# Patient Record
Sex: MALE | Race: WHITE
[De-identification: names, ages, dates, MRNs, and addresses within clinical notes are randomized per-mention and may not be internally consistent; named-entity substitution may affect disease eponyms.]

---

## 2020-07-29 ENCOUNTER — HOSPITAL ENCOUNTER (OUTPATIENT)
Dept: HOSPITAL 19 - COL.CAR | Age: 65
Discharge: HOME | End: 2020-07-29
Attending: INTERNAL MEDICINE
Payer: COMMERCIAL

## 2020-07-29 VITALS — HEART RATE: 62 BPM | SYSTOLIC BLOOD PRESSURE: 110 MMHG | DIASTOLIC BLOOD PRESSURE: 64 MMHG | TEMPERATURE: 98 F

## 2020-07-29 VITALS — SYSTOLIC BLOOD PRESSURE: 113 MMHG | DIASTOLIC BLOOD PRESSURE: 69 MMHG | HEART RATE: 75 BPM

## 2020-07-29 VITALS — SYSTOLIC BLOOD PRESSURE: 117 MMHG | HEART RATE: 70 BPM | DIASTOLIC BLOOD PRESSURE: 73 MMHG

## 2020-07-29 VITALS — HEART RATE: 70 BPM | DIASTOLIC BLOOD PRESSURE: 70 MMHG | SYSTOLIC BLOOD PRESSURE: 115 MMHG

## 2020-07-29 VITALS — HEART RATE: 70 BPM | DIASTOLIC BLOOD PRESSURE: 73 MMHG | SYSTOLIC BLOOD PRESSURE: 115 MMHG

## 2020-07-29 VITALS — SYSTOLIC BLOOD PRESSURE: 115 MMHG | HEART RATE: 71 BPM | DIASTOLIC BLOOD PRESSURE: 68 MMHG

## 2020-07-29 VITALS — HEART RATE: 70 BPM | DIASTOLIC BLOOD PRESSURE: 75 MMHG | SYSTOLIC BLOOD PRESSURE: 119 MMHG

## 2020-07-29 VITALS — WEIGHT: 216.27 LBS | HEIGHT: 70 IN | BODY MASS INDEX: 30.96 KG/M2

## 2020-07-29 DIAGNOSIS — Z79.01: ICD-10-CM

## 2020-07-29 DIAGNOSIS — M10.9: ICD-10-CM

## 2020-07-29 DIAGNOSIS — F41.9: ICD-10-CM

## 2020-07-29 DIAGNOSIS — I25.5: ICD-10-CM

## 2020-07-29 DIAGNOSIS — I48.91: Primary | ICD-10-CM

## 2020-07-29 DIAGNOSIS — Z95.0: ICD-10-CM

## 2020-07-29 DIAGNOSIS — I50.9: ICD-10-CM

## 2020-07-29 DIAGNOSIS — I25.10: ICD-10-CM

## 2020-07-29 DIAGNOSIS — I42.0: ICD-10-CM

## 2020-07-29 DIAGNOSIS — E78.2: ICD-10-CM

## 2020-07-29 DIAGNOSIS — M19.90: ICD-10-CM

## 2020-07-29 DIAGNOSIS — Z85.828: ICD-10-CM

## 2020-07-29 DIAGNOSIS — G47.33: ICD-10-CM

## 2020-07-29 DIAGNOSIS — Z87.891: ICD-10-CM

## 2020-07-29 DIAGNOSIS — I11.0: ICD-10-CM

## 2020-07-29 LAB
ANION GAP SERPL CALC-SCNC: 7 MMOL/L (ref 7–16)
APTT PPP: 38 SECONDS (ref 26–37)
BUN SERPL-MCNC: 18 MG/DL (ref 9–20)
CALCIUM SERPL-MCNC: 9.3 MG/DL (ref 8.4–10.2)
CHLORIDE SERPL-SCNC: 103 MMOL/L (ref 98–107)
CO2 SERPL-SCNC: 27 MMOL/L (ref 22–30)
CREAT SERPL-SCNC: 1.12 UMOL/L (ref 0.66–1.25)
ERYTHROCYTE [DISTWIDTH] IN BLOOD BY AUTOMATED COUNT: 12 % (ref 11.5–14.5)
GLUCOSE SERPL-MCNC: 113 MG/DL (ref 74–106)
HCT VFR BLD AUTO: 43.7 % (ref 42–52)
HGB BLD-MCNC: 14.5 G/DL (ref 13.5–18)
INR BLD: 1.3 (ref 0.8–3)
MAGNESIUM SERPL-MCNC: 2.6 MG/DL (ref 1.6–2.3)
MCH RBC QN AUTO: 30 PG (ref 27–31)
MCHC RBC AUTO-ENTMCNC: 33 G/DL (ref 33–37)
MCV RBC AUTO: 91 FL (ref 80–100)
PLATELET # BLD AUTO: 372 K/MM3 (ref 130–400)
PMV BLD AUTO: 9.9 FL (ref 7.4–10.4)
POTASSIUM SERPL-SCNC: 5 MMOL/L (ref 3.4–5)
PROTHROMBIN TIME: 14.8 SECONDS (ref 9.7–12.8)
RBC # BLD AUTO: 4.78 M/MM3 (ref 4.2–5.6)
SODIUM SERPL-SCNC: 137 MMOL/L (ref 137–145)

## 2020-07-29 NOTE — NUR
Pt ready for departure, to exit via wc at this time.  Pt has done well during
recovery, he has remains gcs 15, pwd with reg and unlabored resps.  AV paced
on monitor.  pt is ambulatory with steady gait at time of departure. He
verbalizes understanding of dc/fu and medication instructions. I called [facility] to confirm our understanding of rx instructions. pt denied any further
questions.   IV was dc'd with cath intact, dressing was applied.

## 2020-12-22 ENCOUNTER — HOSPITAL ENCOUNTER (OUTPATIENT)
Dept: HOSPITAL 19 - COL.CAR | Age: 65
Discharge: HOME | End: 2020-12-22
Attending: INTERNAL MEDICINE
Payer: MEDICARE

## 2020-12-22 VITALS — HEART RATE: 69 BPM | DIASTOLIC BLOOD PRESSURE: 66 MMHG | SYSTOLIC BLOOD PRESSURE: 104 MMHG

## 2020-12-22 VITALS — DIASTOLIC BLOOD PRESSURE: 72 MMHG | HEART RATE: 69 BPM | SYSTOLIC BLOOD PRESSURE: 114 MMHG | TEMPERATURE: 98.9 F

## 2020-12-22 VITALS — DIASTOLIC BLOOD PRESSURE: 88 MMHG | SYSTOLIC BLOOD PRESSURE: 120 MMHG | HEART RATE: 72 BPM

## 2020-12-22 VITALS — DIASTOLIC BLOOD PRESSURE: 70 MMHG | HEART RATE: 69 BPM | SYSTOLIC BLOOD PRESSURE: 109 MMHG

## 2020-12-22 VITALS — HEART RATE: 79 BPM | SYSTOLIC BLOOD PRESSURE: 113 MMHG | DIASTOLIC BLOOD PRESSURE: 69 MMHG

## 2020-12-22 VITALS — DIASTOLIC BLOOD PRESSURE: 64 MMHG | HEART RATE: 69 BPM | SYSTOLIC BLOOD PRESSURE: 97 MMHG

## 2020-12-22 VITALS — DIASTOLIC BLOOD PRESSURE: 69 MMHG | HEART RATE: 69 BPM | SYSTOLIC BLOOD PRESSURE: 97 MMHG

## 2020-12-22 VITALS — HEART RATE: 69 BPM | DIASTOLIC BLOOD PRESSURE: 76 MMHG | SYSTOLIC BLOOD PRESSURE: 118 MMHG

## 2020-12-22 VITALS — HEART RATE: 69 BPM | SYSTOLIC BLOOD PRESSURE: 100 MMHG | DIASTOLIC BLOOD PRESSURE: 66 MMHG

## 2020-12-22 VITALS — WEIGHT: 216.49 LBS | BODY MASS INDEX: 30.99 KG/M2 | HEIGHT: 70 IN

## 2020-12-22 VITALS — DIASTOLIC BLOOD PRESSURE: 64 MMHG | SYSTOLIC BLOOD PRESSURE: 96 MMHG | HEART RATE: 69 BPM

## 2020-12-22 VITALS — HEART RATE: 71 BPM | SYSTOLIC BLOOD PRESSURE: 111 MMHG | DIASTOLIC BLOOD PRESSURE: 66 MMHG

## 2020-12-22 DIAGNOSIS — I50.9: ICD-10-CM

## 2020-12-22 DIAGNOSIS — E78.5: ICD-10-CM

## 2020-12-22 DIAGNOSIS — Z45.02: Primary | ICD-10-CM

## 2020-12-22 DIAGNOSIS — I48.91: ICD-10-CM

## 2020-12-22 DIAGNOSIS — I42.9: ICD-10-CM

## 2020-12-22 DIAGNOSIS — I25.10: ICD-10-CM

## 2020-12-22 DIAGNOSIS — I11.0: ICD-10-CM

## 2020-12-22 LAB
ANION GAP SERPL CALC-SCNC: 9 MMOL/L (ref 7–16)
BUN SERPL-MCNC: 28 MG/DL (ref 9–20)
CALCIUM SERPL-MCNC: 9.1 MG/DL (ref 8.4–10.2)
CHLORIDE SERPL-SCNC: 107 MMOL/L (ref 98–107)
CO2 SERPL-SCNC: 23 MMOL/L (ref 22–30)
CREAT SERPL-SCNC: 1.25 UMOL/L (ref 0.66–1.25)
ERYTHROCYTE [DISTWIDTH] IN BLOOD BY AUTOMATED COUNT: 13.6 % (ref 11.5–14.5)
GLUCOSE SERPL-MCNC: 122 MG/DL (ref 74–106)
HCT VFR BLD AUTO: 42.4 % (ref 42–52)
HGB BLD-MCNC: 14.4 G/DL (ref 13.5–18)
INR BLD: 1 (ref 0.8–3)
MCH RBC QN AUTO: 31 PG (ref 27–31)
MCHC RBC AUTO-ENTMCNC: 34 G/DL (ref 33–37)
MCV RBC AUTO: 92 FL (ref 80–100)
PLATELET # BLD AUTO: 323 K/MM3 (ref 130–400)
PMV BLD AUTO: 9.7 FL (ref 7.4–10.4)
POTASSIUM SERPL-SCNC: 4.7 MMOL/L (ref 3.4–5)
PROTHROMBIN TIME: 10.9 SECONDS (ref 9.7–12.8)
RBC # BLD AUTO: 4.61 M/MM3 (ref 4.2–5.6)
SODIUM SERPL-SCNC: 139 MMOL/L (ref 137–145)

## 2020-12-22 PROCEDURE — C1882 AICD, OTHER THAN SING/DUAL: HCPCS

## 2020-12-22 NOTE — NUR
Patient education provided and discussed.  Patient with no questions or
concerns.  Patient verbally aknowledged education.  Patient informed of
need to schedule for 1 week appt with PCP for site check and follow up
with Dr. Galarza in 3 months. Alert and oriented X 4.  Patient signed
aknowlegment of receipt of discharge and patient education.  IV removed from
left wrist.  Patient denies pain or discomfort at this time.  Vitals stable.
Cardiac monitor shows pacer marks indicating that pacemaker is functional.
Patient as reports that he no longer feels the irregularity he did prior to
the generator change.  [facility]
 him with their.
personal vehicle.

## 2021-04-06 ENCOUNTER — HOSPITAL ENCOUNTER (OUTPATIENT)
Dept: HOSPITAL 19 - COL.CAR | Age: 66
Discharge: HOME | End: 2021-04-06
Attending: INTERNAL MEDICINE
Payer: MEDICARE

## 2021-04-06 VITALS — HEIGHT: 70 IN | BODY MASS INDEX: 29.95 KG/M2 | WEIGHT: 209.22 LBS

## 2021-04-06 VITALS — TEMPERATURE: 98.3 F | HEART RATE: 75 BPM | DIASTOLIC BLOOD PRESSURE: 75 MMHG | SYSTOLIC BLOOD PRESSURE: 109 MMHG

## 2021-04-06 DIAGNOSIS — I25.5: ICD-10-CM

## 2021-04-06 DIAGNOSIS — Z87.891: ICD-10-CM

## 2021-04-06 DIAGNOSIS — Z98.52: ICD-10-CM

## 2021-04-06 DIAGNOSIS — E78.5: ICD-10-CM

## 2021-04-06 DIAGNOSIS — Z79.01: ICD-10-CM

## 2021-04-06 DIAGNOSIS — I11.0: ICD-10-CM

## 2021-04-06 DIAGNOSIS — Z85.828: ICD-10-CM

## 2021-04-06 DIAGNOSIS — M10.9: ICD-10-CM

## 2021-04-06 DIAGNOSIS — I25.10: ICD-10-CM

## 2021-04-06 DIAGNOSIS — Z79.82: ICD-10-CM

## 2021-04-06 DIAGNOSIS — F41.9: ICD-10-CM

## 2021-04-06 DIAGNOSIS — I50.9: ICD-10-CM

## 2021-04-06 DIAGNOSIS — I48.0: Primary | ICD-10-CM

## 2021-04-06 NOTE — NUR
DC instructions reviewed with pt and wife. Both express understanding. He
ambulates out from dept under own power, escorted to elevator with wife and
personal belongings.

## 2021-04-06 NOTE — NUR
Dr Galarza in to speak with pt after EKG and pacemaker interrogation were
completed and showed a paced rhythm. Covid swab and IV start were held as pt
appears to be paced.

## 2021-11-12 ENCOUNTER — HOSPITAL ENCOUNTER (OUTPATIENT)
Dept: HOSPITAL 19 - COL.CAR | Age: 66
End: 2021-11-12
Attending: INTERNAL MEDICINE
Payer: MEDICARE

## 2021-11-12 VITALS — WEIGHT: 216.49 LBS | BODY MASS INDEX: 30.99 KG/M2 | HEIGHT: 70 IN

## 2021-11-12 DIAGNOSIS — I48.91: Primary | ICD-10-CM

## 2021-11-12 DIAGNOSIS — Z53.8: ICD-10-CM

## 2021-11-12 NOTE — NUR
Procedure cancelled.Discharge instructions given to pt.Pt verbalizes
understanding.Pt escorted out by this nurse.

## 2022-05-11 NOTE — NUR
DISCUSSED DISCHARGE INFO WITH PATIENT AND WIFE, NEITHER HAD ANY QUESTIONS,
VERBALIZED UNDERSTANDING.  IV DC'D, PATIENT DRESSED ESCORTD TO CAR VIA
WHEELCHAIR.

## 2022-05-11 NOTE — NUR
DISCUSSED DISCHARGE INSTRUCTIONS WITH PATIENT AND WIFE, DENIED QUESTIONS,
VERBALIZED UNDERSTANDING OF INSTRUCTIONS, DISCHARGE PAPERS SIGNED BY PATIENT,
IV DC'D PATIENT [id number] BY SELF, ESCORTED TO CAR VIA WHEELCHAIR.